# Patient Record
Sex: FEMALE | Race: WHITE | NOT HISPANIC OR LATINO | Employment: UNEMPLOYED | URBAN - METROPOLITAN AREA
[De-identification: names, ages, dates, MRNs, and addresses within clinical notes are randomized per-mention and may not be internally consistent; named-entity substitution may affect disease eponyms.]

---

## 2018-08-30 ASSESSMENT — MIFFLIN-ST. JEOR: SCORE: 1251.12

## 2018-09-05 ENCOUNTER — ANESTHESIA - HEALTHEAST (OUTPATIENT)
Dept: SURGERY | Facility: CLINIC | Age: 53
End: 2018-09-05

## 2018-09-05 ENCOUNTER — SURGERY - HEALTHEAST (OUTPATIENT)
Dept: SURGERY | Facility: CLINIC | Age: 53
End: 2018-09-05

## 2018-09-05 ASSESSMENT — MIFFLIN-ST. JEOR: SCORE: 1247.43

## 2021-06-01 VITALS — WEIGHT: 143.19 LBS | HEIGHT: 65 IN | BODY MASS INDEX: 23.86 KG/M2

## 2021-06-20 ENCOUNTER — HEALTH MAINTENANCE LETTER (OUTPATIENT)
Age: 56
End: 2021-06-20

## 2021-06-20 NOTE — ANESTHESIA CARE TRANSFER NOTE
Last vitals:   Vitals:    09/05/18 1514   BP: 114/65   Pulse: 75   Resp: 16   Temp: 36.8  C (98.3  F)   SpO2: 100%     Patient's level of consciousness is drowsy  Spontaneous respirations: yes  Maintains airway independently: yes  Dentition unchanged: yes  Oropharynx: oropharynx clear of all foreign objects    QCDR Measures:  ASA# 20 - Surgical Safety Checklist: WHO surgical safety checklist completed prior to induction  PQRS# 430 - Adult PONV Prevention: 4558F - Pt received => 2 anti-emetic agents (different classes) preop & intraop  ASA# 8 - Peds PONV Prevention: NA - Not pediatric patient, not GA or 2 or more risk factors NOT present  PQRS# 424 - [place]-op Temp Management: 4559F - At least one body temp DOCUMENTED => 35.5C or 95.9F within required timeframe  PQRS# 426 - PACU Transfer Protocol: - Transfer of care checklist used  ASA# 14 - Acute Post-op Pain: ASA14B - Patient did NOT experience pain >= 7 out of 10

## 2021-06-20 NOTE — ANESTHESIA PROCEDURE NOTES
Peripheral Block    Patient location during procedure: pre-op  Start time: 9/5/2018 11:51 AM  End time: 9/5/2018 11:56 AM  post-op analgesia per surgeon order as noted in medical record  Staffing:  Performing  Anesthesiologist: JACLYN JAIN  Preanesthetic Checklist  Completed: patient identified, site marked, risks, benefits, and alternatives discussed, timeout performed, consent obtained, at patient's request, airway assessed, oxygen available, suction available, emergency drugs available and hand hygiene performed  Peripheral Block  Block type: saphenous  Prep: ChloraPrep  Patient position: supine  Patient monitoring: cardiac monitor, continuous pulse oximetry, blood pressure and heart rate  Laterality: left  Injection technique: ultrasound guided    Ultrasound used to visualize needle placement in proximity to nerve being blocked: yes   Permanent ultrasound image captured for medical record  Sterile gel and probe cover used for ultrasound.    Needle  Needle type: Stimuplex   Needle gauge: 20G  Needle length: 6 in  no peripheral nerve catheter placed  Assessment  Injection assessment: negative aspiration for heme, no difficulty with injection, no paresthesia on injection and incremental injection

## 2021-06-20 NOTE — ANESTHESIA PREPROCEDURE EVALUATION
Anesthesia Evaluation      Patient summary reviewed   History of anesthetic complications     Airway   Mallampati: I  Neck ROM: full   Pulmonary - normal exam   (+) a smoker    ROS comment: Allergic rhinitis.                         Cardiovascular - negative ROS and normal exam  Exercise tolerance: > or = 4 METS  ECG reviewed     ROS comment: DVT/PE.     Neuro/Psych - negative ROS     Endo/Other - negative ROS   (-) no obesity     GI/Hepatic/Renal    (+) GERD,        Other findings: PONV.      Dental - normal exam                        Anesthesia Plan  Planned anesthetic: general LMA and peripheral nerve block  Sciatic and saphenous nerve blocks for POP per surgeon request.  Decadron, Zofran.  Diprivan infusion.  Ketamine (0.5 mg/kg).  Emend.  Toradol.  Tylenol.  ASA 2   Induction: intravenous   Anesthetic plan and risks discussed with: patient  Anesthesia plan special considerations: antiemetics,   Post-op plan: routine recovery

## 2021-06-20 NOTE — ANESTHESIA PROCEDURE NOTES
Peripheral Block    Patient location during procedure: pre-op  Start time: 9/5/2018 11:43 AM  End time: 9/5/2018 11:51 AM  post-op analgesia per surgeon order as noted in medical record  Staffing:  Performing  Anesthesiologist: JACLYN JAIN  Preanesthetic Checklist  Completed: patient identified, site marked, risks, benefits, and alternatives discussed, timeout performed, consent obtained, at patient's request, airway assessed, oxygen available, suction available, emergency drugs available and hand hygiene performed  Peripheral Block  Block type: sciatic  Prep: ChloraPrep  Patient position: supine  Patient monitoring: cardiac monitor, continuous pulse oximetry, blood pressure and heart rate  Laterality: left  Injection technique: ultrasound guided    Ultrasound used to visualize needle placement in proximity to nerve being blocked: yes   Permanent ultrasound image captured for medical record  Sterile gel and probe cover used for ultrasound.    Needle  Needle type: Stimuplex   Needle gauge: 20G  Needle length: 6 in  no peripheral nerve catheter placed  Assessment  Injection assessment: negative aspiration for heme, no difficulty with injection, no paresthesia on injection and incremental injection  Additional Notes  Block done at the bifurcation of the sciatic nerve.

## 2021-06-20 NOTE — ANESTHESIA POSTPROCEDURE EVALUATION
Patient: Kati Schulte  1. LEFT ANKLE ARTHROSCOPIC EVALUATION AND DEBRIDEMENT, LATERAL ANKLE LIGAMENT REPAIR WITH AUGMENTATION, PERONEAL TENDON REPAIR , LEFT 1ST METATARSAL [place]WICK OSTEOTOMY, UMBILICAL HERNIA REPAIR, HEEL SPUR EXCISION  Anesthesia type: general    Patient location: PACU  Last vitals:   Vitals:    09/05/18 1700   BP: (!) 151/92   Pulse: 81   Resp: 16   Temp:    SpO2: 98%     Post vital signs: stable  Level of consciousness: awake and responds to simple questions  Post-anesthesia pain: pain controlled  Post-anesthesia nausea and vomiting: no  Pulmonary: unassisted, return to baseline  Cardiovascular: stable and blood pressure at baseline  Hydration: adequate  Anesthetic events: no    QCDR Measures:  ASA# 11 - [place]-op Cardiac Arrest: ASA11B - Patient did NOT experience unanticipated cardiac arrest  ASA# 12 - [place]-op Mortality Rate: ASA12B - Patient did NOT die  ASA# 13 - PACU Re-Intubation Rate: ASA13B - Patient did NOT require a new airway mgmt  ASA# 10 - Composite Anes Safety: ASA10A - No serious adverse event    Additional Notes:

## 2021-10-11 ENCOUNTER — HEALTH MAINTENANCE LETTER (OUTPATIENT)
Age: 56
End: 2021-10-11

## 2022-07-17 ENCOUNTER — HEALTH MAINTENANCE LETTER (OUTPATIENT)
Age: 57
End: 2022-07-17

## 2022-09-25 ENCOUNTER — HEALTH MAINTENANCE LETTER (OUTPATIENT)
Age: 57
End: 2022-09-25

## 2023-08-03 ENCOUNTER — OFFICE VISIT (OUTPATIENT)
Dept: CARDIOLOGY | Facility: CLINIC | Age: 58
End: 2023-08-03
Payer: COMMERCIAL

## 2023-08-03 VITALS
OXYGEN SATURATION: 99 % | BODY MASS INDEX: 26.5 KG/M2 | DIASTOLIC BLOOD PRESSURE: 85 MMHG | SYSTOLIC BLOOD PRESSURE: 139 MMHG | HEART RATE: 62 BPM | RESPIRATION RATE: 14 BRPM | HEIGHT: 64 IN | WEIGHT: 155.2 LBS

## 2023-08-03 DIAGNOSIS — R07.2 PRECORDIAL PAIN: ICD-10-CM

## 2023-08-03 DIAGNOSIS — R00.2 PALPITATIONS: ICD-10-CM

## 2023-08-03 DIAGNOSIS — I31.39 PERICARDIAL EFFUSION: Primary | ICD-10-CM

## 2023-08-03 LAB
CRP SERPL HS-MCNC: 0.71 MG/L
ERYTHROCYTE [SEDIMENTATION RATE] IN BLOOD BY WESTERGREN METHOD: 8 MM/HR (ref 0–30)

## 2023-08-03 PROCEDURE — 36415 COLL VENOUS BLD VENIPUNCTURE: CPT | Performed by: INTERNAL MEDICINE

## 2023-08-03 PROCEDURE — 86141 C-REACTIVE PROTEIN HS: CPT | Performed by: INTERNAL MEDICINE

## 2023-08-03 PROCEDURE — 99204 OFFICE O/P NEW MOD 45 MIN: CPT | Performed by: INTERNAL MEDICINE

## 2023-08-03 PROCEDURE — 85652 RBC SED RATE AUTOMATED: CPT | Performed by: INTERNAL MEDICINE

## 2023-08-03 RX ORDER — ESTRADIOL 0.1 MG/G
1 CREAM VAGINAL
COMMUNITY
Start: 2023-05-12

## 2023-08-03 RX ORDER — VALACYCLOVIR HYDROCHLORIDE 500 MG/1
1 TABLET, FILM COATED ORAL 2 TIMES DAILY
COMMUNITY
Start: 2023-04-12

## 2023-08-03 RX ORDER — CHLORAL HYDRATE 500 MG
2000 CAPSULE ORAL
COMMUNITY

## 2023-08-03 RX ORDER — SULFACETAMIDE SODIUM, SULFUR 100; 50 MG/G; MG/G
LOTION TOPICAL
COMMUNITY
Start: 2023-06-12 | End: 2024-06-11

## 2023-08-03 RX ORDER — LORAZEPAM 0.5 MG/1
TABLET ORAL
COMMUNITY
Start: 2022-09-26

## 2023-08-03 RX ORDER — CELECOXIB 200 MG/1
1 CAPSULE ORAL DAILY
COMMUNITY
Start: 2022-11-11

## 2023-08-03 RX ORDER — EPINEPHRINE 0.3 MG/.3ML
INJECTION SUBCUTANEOUS
COMMUNITY
Start: 2023-01-13

## 2023-08-03 RX ORDER — SULFACETAMIDE SODIUM, SULFUR 100; 50 MG/G; MG/G
EMULSION TOPICAL
COMMUNITY
Start: 2023-06-16

## 2023-08-03 RX ORDER — TRAZODONE HYDROCHLORIDE 50 MG/1
50 TABLET, FILM COATED ORAL
COMMUNITY
Start: 2023-08-01 | End: 2024-07-31

## 2023-08-03 RX ORDER — CLINDAMYCIN PHOSPHATE 11.9 MG/ML
SOLUTION TOPICAL DAILY
COMMUNITY
Start: 2023-07-31 | End: 2024-07-30

## 2023-08-03 RX ORDER — ACETAZOLAMIDE 250 MG/1
TABLET ORAL
COMMUNITY
Start: 2023-06-22

## 2023-08-03 RX ORDER — METOPROLOL TARTRATE 50 MG
25 TABLET ORAL
COMMUNITY
Start: 2023-05-16

## 2023-08-03 RX ORDER — METRONIDAZOLE 500 MG/1
TABLET ORAL
COMMUNITY
Start: 2023-05-15

## 2023-08-03 NOTE — LETTER
8/3/2023    Guanako Tse  [address]  [facility] [address]    RE: Kati Schulte       Dear Colleague,     I had the pleasure of seeing Kati Schulte in the [facility].      [facility]  [phone]          Assessment/Recommendations   Patient with palpitations, chest discomfort which has some atypical features but sometimes can be brought on with physical activity, and small pericardial effusion.  She does not have classic pericarditis symptoms but I certainly cannot exclude some inflammatory component.  Minimal risk factors for coronary artery disease but certainly cannot exclude the possibility of a cardiac etiology of her discomfort.    Would like to get a CRP and a sedimentation rate to see if there is any generalized inflammation at this time.  She is agreeable.    Would also like to get a stress echocardiogram to see if her rhythm disturbances will go away with activity or worsen with activity.  We will also evaluate for myocardial ischemia and get a repeat look at her pericardial effusion.    If the above tests are unremarkable, would have a low threshold to recommend a calcium score for risk assessment.  She would be interested in that as well.    If the above tests are unremarkable, I would follow-up with an echocardiogram in 1 year and a clinic visit.    Thank you for allowing us to precipitate in her care.       History of Present Illness/Subjective    Ms. Kati Schulte is a 58 year old female with history of palpitations which seem to come and go at times and sometimes can feel as many as 7 or 8 beats in a row.  She has had a Holter monitor which did not show any worrisome rhythm disturbances.  She also had some chest discomfort which she felt was related to spine issues and when her chiropractor would readjust her the discomfort would get better.  They would be anteriorly near these sternal costal joints and sometimes with the tenderness and even  "some swelling in that area.  The seem to have gotten better.  She also had an episode of chest discomfort which was different on the left side that would come and go.  Would last a couple minutes and go away after about 3 hours she also went to her chiropractor got adjusted and later that evening these discomforts went away.  This was not associate with shortness of breath, diaphoresis and did eventually radiate toward her back but no radiation for most of the time she had it.    She is fairly active and walks briskly for at least 30 minutes on her lunch hour daily.  She feels a little more short of breath than she was in the past but she used to be a vigorous exerciser.  She denies orthopnea, paroxysmal nocturnal dyspnea, peripheral edema, syncope, near syncope but does have palpitations.  No history of rheumatic fever, heart murmur, cerebrovascular accident or TIA.    She is not hypertensive, does not smoke cigarettes, has a very reasonable lipid panel with a high HDL.  No family history of premature coronary artery disease and she is not diabetic.  She grew up in a small apartment community in [place] and [place]  She is a .  She has 1 child and grandchildren.  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        ECG: Personally reviewed.        Physical Examination Review of Systems   /85 (BP Location: Left arm, Patient Position: Sitting, Cuff Size: Adult Regular)   Pulse 62   Resp 14   Ht 1.626 m (5' 4\")   Wt 70.4 kg (155 lb 3.2 oz)   SpO2 99%   BMI 26.64 kg/m    Body mass index is 26.64 kg/m .  Wt Readings from Last 3 Encounters:   08/03/23 70.4 kg (155 lb 3.2 oz)   09/05/18 64.9 kg (143 lb 3 oz)     General Appearance:   Alert, cooperative and in no acute distress.   ENT/Mouth: Pink/moist oral mucosa   EYES:  no scleral icterus, normal conjunctivae   Neck: JVP normal. No " "Hepatojugular reflux. Thyroid not visualized.   Chest/Lungs:   Lungs are clear to auscultation, equal chest wall expansion.   Cardiovascular:   S1, S2 without murmur ,clicks or rubs. Brachial, radial and posterior tibial pulses are intact and symetric. No carotid bruits noted   Abdomen:  Nontender.    Extremities: No cyanosis, clubbing or edema   Skin: no xanthelasma, warm.    Neurologic: normal arm movement bilateral, no tremors     Psychiatric: Appropriate affect.      Enc Vitals  BP: 139/85  Pulse: 62  Resp: 14  SpO2: 99 %  Weight: 70.4 kg (155 lb 3.2 oz)  Height: 162.6 cm (5' 4\")                                           Medical History  Surgical History Family History Social History   No past medical history on file. Past Surgical History:   Procedure Laterality Date    ARTHROSCOPY ANKLE Left 9/5/2018    Procedure: LEFT ANKLE ARTHROSCOPIC EVALUATION AND DEBRIDEMENT;  Surgeon: Karel Antonio DPM;  Location: [facility] Main OR;  Service:     ARTHROSCOPY KNEE      CERVICAL FUSION      HERNIA REPAIR, UMBILICAL N/A 9/5/2018    Procedure: UMBILICAL HERNIA REPAIR;  Surgeon: Berny Villasenor MD;  Location: [facility] Main OR;  Service:     RECONSTRUCT ANKLE Left 9/5/2018    Procedure: LATERAL ANKLE LIGAMENT REPAIR WITH AUGMENTATION;  Surgeon: Karel Antonio DPM;  Location: [facility] Main OR;  Service:     REPAIR TENDON PERONEAL Left 9/5/2018    Procedure: PERONEAL TENDON REPAIR ;  Surgeon: Karel Antonio DPM;  Location: [facility] Main OR;  Service:     WRIST SURGERY      No family history on file. Social History     Socioeconomic History    Marital status:      Spouse name: Not on file    Number of children: Not on file    Years of education: Not on file    Highest education level: Not on file   Occupational History    Not on file   Tobacco Use    Smoking status: Former    Smokeless tobacco: Never   Vaping Use    Vaping Use: Never used   Substance and Sexual Activity    Alcohol use: Yes    Drug " use: No    Sexual activity: Not on file   Other Topics Concern    Not on file   Social History Narrative    Not on file     Social Determinants of Health     Financial Resource Strain: Not on file   Food Insecurity: Not on file   Transportation Needs: Not on file   Physical Activity: Not on file   Stress: Not on file   Social Connections: Not on file   Intimate Partner Violence: Not on file   Housing Stability: Not on file          Medications  Allergies   Current Outpatient Medications   Medication Sig Dispense Refill    acetaZOLAMIDE (DIAMOX) 250 MG tablet Take 1 Tablet by mouth two times a day. start day before ascent and continue 2-3 days at maximum altitude      carisoprodol (SOMA) 350 MG tablet [CARISOPRODOL (SOMA) 350 MG TABLET] Take 350 mg by mouth 2 (two) times a day as needed for muscle spasms.      celecoxib (CELEBREX) 200 MG capsule Take 1 capsule by mouth daily      cholecalciferol, vitamin D3, (VITAMIN D3 ORAL) [CHOLECALCIFEROL, VITAMIN D3, (VITAMIN D3 ORAL)] Take 2,000 Units by mouth daily.       clindamycin (CLEOCIN T) 1 % external solution Apply topically daily      diazepam (VALIUM ORAL) [DIAZEPAM (VALIUM ORAL)] Take 10 mg by mouth at bedtime as needed.       diclofenac (VOLTAREN) 1 % topical gel       EPINEPHrine (ANY BX GENERIC EQUIV) 0.3 MG/0.3ML injection 2-pack INJECT 0.3 ML INTRAMUSCULARLY AS NEEDED. MAY REPEAT      esomeprazole (NEXIUM) 40 MG capsule [ESOMEPRAZOLE (NEXIUM) 40 MG CAPSULE] Take 40 mg by mouth daily before breakfast.      estradiol (ESTRACE) 0.1 MG/GM vaginal cream Place 1 g vaginally      fish oil-omega-3 fatty acids 1000 MG capsule Take 2,000 mg by mouth      fluticasone (FLONASE) 50 mcg/actuation nasal spray [FLUTICASONE (FLONASE) 50 MCG/ACTUATION NASAL SPRAY] Apply 1 spray into each nostril 2 (two) times a day.      hydrOXYzine pamoate (VISTARIL) 25 MG capsule [HYDROXYZINE PAMOATE (VISTARIL) 25 MG CAPSULE] Take 1 capsule (25 mg total) by mouth 4 (four) times a day as needed  for itching (post op pain associated). 28 capsule 0    levocetirizine (XYZAL) 5 MG tablet [LEVOCETIRIZINE (XYZAL) 5 MG TABLET] Take 5 mg by mouth daily.      LEVOCETIRIZINE DIHYDROCHLORIDE PO       levonorgestrel (MIRENA) 52 MG (20 mcg/day) IUD 1 each by Intrauterine route      LORazepam (ATIVAN) 0.5 MG tablet TAKE ONE TO TWO TABLETS BY MOUTH EVERY 6 HOURS AS NEEDED FOR ANXIETY      metoprolol tartrate (LOPRESSOR) 50 MG tablet Take 25 mg by mouth      metroNIDAZOLE (FLAGYL) 500 MG tablet       MULTIVITAMIN ORAL [MULTIVITAMIN ORAL] Take 1 tablet by mouth daily.      oxyCODONE-acetaminophen (PERCOCET) 5-325 mg per tablet [OXYCODONE-ACETAMINOPHEN (PERCOCET) 5-325 MG PER TABLET] Take 1 tablet by mouth every 4 (four) hours as needed for pain. 28 tablet 0    Sulfacetamide Sodium-Sulfur 10-5 % LIQD       sulfacetamide sodium-sulfur 10-5 % LOTN CLEANSE FACE ONCE DAILY FOR ACNE      traZODone (DESYREL) 50 MG tablet Take 50 mg by mouth      tretinoin (RETIN-A) 0.025 % cream [TRETINOIN (RETIN-A) 0.025 % CREAM] Apply topically daily as needed.      valACYclovir (VALTREX) 500 MG tablet Take 1 tablet by mouth 2 times daily      Allergies   Allergen Reactions    Bee Venom Protein (Honey Bee) [Bee Venom] Unknown    Sulfa (Sulfonamide Antibiotics) [Sulfa Antibiotics] Rash         Lab Results    Chemistry/lipid CBC Cardiac Enzymes/BNP/TSH/INR   No results found for: CHOL, HDL, TRIG, CHOLHDL, CREATININE, BUN, NA, CO2 No results found for: WBC, HGB, HCT, MCV, PLT No results found for: CKTOTAL, CKMB, TROPONINI, BNP, TSH, INR             Thank you for allowing me to participate in the care of your patient.      Sincerely,     Vitaly Grubbs MD     [facility]  cc:   No referring provider defined for this encounter.

## 2023-08-03 NOTE — PROGRESS NOTES
[facility]  [phone]          Assessment/Recommendations   Patient with palpitations, chest discomfort which has some atypical features but sometimes can be brought on with physical activity, and small pericardial effusion.  She does not have classic pericarditis symptoms but I certainly cannot exclude some inflammatory component.  Minimal risk factors for coronary artery disease but certainly cannot exclude the possibility of a cardiac etiology of her discomfort.    Would like to get a CRP and a sedimentation rate to see if there is any generalized inflammation at this time.  She is agreeable.    Would also like to get a stress echocardiogram to see if her rhythm disturbances will go away with activity or worsen with activity.  We will also evaluate for myocardial ischemia and get a repeat look at her pericardial effusion.    If the above tests are unremarkable, would have a low threshold to recommend a calcium score for risk assessment.  She would be interested in that as well.    If the above tests are unremarkable, I would follow-up with an echocardiogram in 1 year and a clinic visit.    Thank you for allowing us to precipitate in her care.       History of Present Illness/Subjective    Ms. Kati Schulte is a 58 year old female with history of palpitations which seem to come and go at times and sometimes can feel as many as 7 or 8 beats in a row.  She has had a Holter monitor which did not show any worrisome rhythm disturbances.  She also had some chest discomfort which she felt was related to spine issues and when her chiropractor would readjust her the discomfort would get better.  They would be anteriorly near these sternal costal joints and sometimes with the tenderness and even some swelling in that area.  The seem to have gotten better.  She also had an episode of chest discomfort which was different on the left side that would come and go.  Would last a couple minutes and go  "away after about 3 hours she also went to her chiropractor got adjusted and later that evening these discomforts went away.  This was not associate with shortness of breath, diaphoresis and did eventually radiate toward her back but no radiation for most of the time she had it.    She is fairly active and walks briskly for at least 30 minutes on her lunch hour daily.  She feels a little more short of breath than she was in the past but she used to be a vigorous exerciser.  She denies orthopnea, paroxysmal nocturnal dyspnea, peripheral edema, syncope, near syncope but does have palpitations.  No history of rheumatic fever, heart murmur, cerebrovascular accident or TIA.    She is not hypertensive, does not smoke cigarettes, has a very reasonable lipid panel with a high HDL.  No family history of premature coronary artery disease and she is not diabetic.  She grew up in a small apartment community in [place] and [place]  She is a .  She has 1 child and grandchildren.  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        ECG: Personally reviewed.        Physical Examination Review of Systems   /85 (BP Location: Left arm, Patient Position: Sitting, Cuff Size: Adult Regular)   Pulse 62   Resp 14   Ht 1.626 m (5' 4\")   Wt 70.4 kg (155 lb 3.2 oz)   SpO2 99%   BMI 26.64 kg/m    Body mass index is 26.64 kg/m .  Wt Readings from Last 3 Encounters:   08/03/23 70.4 kg (155 lb 3.2 oz)   09/05/18 64.9 kg (143 lb 3 oz)     General Appearance:   Alert, cooperative and in no acute distress.   ENT/Mouth: Pink/moist oral mucosa   EYES:  no scleral icterus, normal conjunctivae   Neck: JVP normal. No Hepatojugular reflux. Thyroid not visualized.   Chest/Lungs:   Lungs are clear to auscultation, equal chest wall expansion.   Cardiovascular:   S1, S2 without murmur ,clicks or rubs. Brachial, radial and " "posterior tibial pulses are intact and symetric. No carotid bruits noted   Abdomen:  Nontender.    Extremities: No cyanosis, clubbing or edema   Skin: no xanthelasma, warm.    Neurologic: normal arm movement bilateral, no tremors     Psychiatric: Appropriate affect.      Enc Vitals  BP: 139/85  Pulse: 62  Resp: 14  SpO2: 99 %  Weight: 70.4 kg (155 lb 3.2 oz)  Height: 162.6 cm (5' 4\")                                           Medical History  Surgical History Family History Social History   No past medical history on file. Past Surgical History:   Procedure Laterality Date    ARTHROSCOPY ANKLE Left 9/5/2018    Procedure: LEFT ANKLE ARTHROSCOPIC EVALUATION AND DEBRIDEMENT;  Surgeon: Karel Antonio DPM;  Location: [facility] Main OR;  Service:     ARTHROSCOPY KNEE      CERVICAL FUSION      HERNIA REPAIR, UMBILICAL N/A 9/5/2018    Procedure: UMBILICAL HERNIA REPAIR;  Surgeon: Berny Villasenor MD;  Location: [facility] Main OR;  Service:     RECONSTRUCT ANKLE Left 9/5/2018    Procedure: LATERAL ANKLE LIGAMENT REPAIR WITH AUGMENTATION;  Surgeon: Karel Antonio DPM;  Location: [facility] Main OR;  Service:     REPAIR TENDON PERONEAL Left 9/5/2018    Procedure: PERONEAL TENDON REPAIR ;  Surgeon: Karel Antonio DPM;  Location: [facility] Main OR;  Service:     WRIST SURGERY      No family history on file. Social History     Socioeconomic History    Marital status:      Spouse name: Not on file    Number of children: Not on file    Years of education: Not on file    Highest education level: Not on file   Occupational History    Not on file   Tobacco Use    Smoking status: Former    Smokeless tobacco: Never   Vaping Use    Vaping Use: Never used   Substance and Sexual Activity    Alcohol use: Yes    Drug use: No    Sexual activity: Not on file   Other Topics Concern    Not on file   Social History Narrative    Not on file     Social Determinants of Health     Financial Resource Strain: Not on file   Food " Insecurity: Not on file   Transportation Needs: Not on file   Physical Activity: Not on file   Stress: Not on file   Social Connections: Not on file   Intimate Partner Violence: Not on file   Housing Stability: Not on file          Medications  Allergies   Current Outpatient Medications   Medication Sig Dispense Refill    acetaZOLAMIDE (DIAMOX) 250 MG tablet Take 1 Tablet by mouth two times a day. start day before ascent and continue 2-3 days at maximum altitude      carisoprodol (SOMA) 350 MG tablet [CARISOPRODOL (SOMA) 350 MG TABLET] Take 350 mg by mouth 2 (two) times a day as needed for muscle spasms.      celecoxib (CELEBREX) 200 MG capsule Take 1 capsule by mouth daily      cholecalciferol, vitamin D3, (VITAMIN D3 ORAL) [CHOLECALCIFEROL, VITAMIN D3, (VITAMIN D3 ORAL)] Take 2,000 Units by mouth daily.       clindamycin (CLEOCIN T) 1 % external solution Apply topically daily      diazepam (VALIUM ORAL) [DIAZEPAM (VALIUM ORAL)] Take 10 mg by mouth at bedtime as needed.       diclofenac (VOLTAREN) 1 % topical gel       EPINEPHrine (ANY BX GENERIC EQUIV) 0.3 MG/0.3ML injection 2-pack INJECT 0.3 ML INTRAMUSCULARLY AS NEEDED. MAY REPEAT      esomeprazole (NEXIUM) 40 MG capsule [ESOMEPRAZOLE (NEXIUM) 40 MG CAPSULE] Take 40 mg by mouth daily before breakfast.      estradiol (ESTRACE) 0.1 MG/GM vaginal cream Place 1 g vaginally      fish oil-omega-3 fatty acids 1000 MG capsule Take 2,000 mg by mouth      fluticasone (FLONASE) 50 mcg/actuation nasal spray [FLUTICASONE (FLONASE) 50 MCG/ACTUATION NASAL SPRAY] Apply 1 spray into each nostril 2 (two) times a day.      hydrOXYzine pamoate (VISTARIL) 25 MG capsule [HYDROXYZINE PAMOATE (VISTARIL) 25 MG CAPSULE] Take 1 capsule (25 mg total) by mouth 4 (four) times a day as needed for itching (post op pain associated). 28 capsule 0    levocetirizine (XYZAL) 5 MG tablet [LEVOCETIRIZINE (XYZAL) 5 MG TABLET] Take 5 mg by mouth daily.      LEVOCETIRIZINE DIHYDROCHLORIDE PO        levonorgestrel (MIRENA) 52 MG (20 mcg/day) IUD 1 each by Intrauterine route      LORazepam (ATIVAN) 0.5 MG tablet TAKE ONE TO TWO TABLETS BY MOUTH EVERY 6 HOURS AS NEEDED FOR ANXIETY      metoprolol tartrate (LOPRESSOR) 50 MG tablet Take 25 mg by mouth      metroNIDAZOLE (FLAGYL) 500 MG tablet       MULTIVITAMIN ORAL [MULTIVITAMIN ORAL] Take 1 tablet by mouth daily.      oxyCODONE-acetaminophen (PERCOCET) 5-325 mg per tablet [OXYCODONE-ACETAMINOPHEN (PERCOCET) 5-325 MG PER TABLET] Take 1 tablet by mouth every 4 (four) hours as needed for pain. 28 tablet 0    Sulfacetamide Sodium-Sulfur 10-5 % LIQD       sulfacetamide sodium-sulfur 10-5 % LOTN CLEANSE FACE ONCE DAILY FOR ACNE      traZODone (DESYREL) 50 MG tablet Take 50 mg by mouth      tretinoin (RETIN-A) 0.025 % cream [TRETINOIN (RETIN-A) 0.025 % CREAM] Apply topically daily as needed.      valACYclovir (VALTREX) 500 MG tablet Take 1 tablet by mouth 2 times daily      Allergies   Allergen Reactions    Bee Venom Protein (Honey Bee) [Bee Venom] Unknown    Sulfa (Sulfonamide Antibiotics) [Sulfa Antibiotics] Rash         Lab Results    Chemistry/lipid CBC Cardiac Enzymes/BNP/TSH/INR   No results found for: CHOL, HDL, TRIG, CHOLHDL, CREATININE, BUN, NA, CO2 No results found for: WBC, HGB, HCT, MCV, PLT No results found for: CKTOTAL, CKMB, TROPONINI, BNP, TSH, INR

## 2023-08-05 ENCOUNTER — HEALTH MAINTENANCE LETTER (OUTPATIENT)
Age: 58
End: 2023-08-05

## 2023-08-16 ENCOUNTER — HOSPITAL ENCOUNTER (OUTPATIENT)
Dept: CARDIOLOGY | Facility: CLINIC | Age: 58
Discharge: HOME | End: 2023-08-16
Attending: INTERNAL MEDICINE | Admitting: INTERNAL MEDICINE
Payer: COMMERCIAL

## 2023-08-16 DIAGNOSIS — R00.2 PALPITATIONS: ICD-10-CM

## 2023-08-16 DIAGNOSIS — R07.2 PRECORDIAL PAIN: ICD-10-CM

## 2023-08-16 DIAGNOSIS — I31.39 PERICARDIAL EFFUSION: ICD-10-CM

## 2023-08-16 PROCEDURE — 93016 CV STRESS TEST SUPVJ ONLY: CPT | Performed by: GENERAL ACUTE CARE HOSPITAL

## 2023-08-16 PROCEDURE — 93017 CV STRESS TEST TRACING ONLY: CPT

## 2023-08-16 PROCEDURE — 255N000002 HC RX 255 OP 636: Performed by: INTERNAL MEDICINE

## 2023-08-16 PROCEDURE — 93352 ADMIN ECG CONTRAST AGENT: CPT | Performed by: GENERAL ACUTE CARE HOSPITAL

## 2023-08-16 PROCEDURE — 93325 DOPPLER ECHO COLOR FLOW MAPG: CPT | Mod: TC

## 2023-08-16 PROCEDURE — 93325 DOPPLER ECHO COLOR FLOW MAPG: CPT | Mod: 26 | Performed by: GENERAL ACUTE CARE HOSPITAL

## 2023-08-16 PROCEDURE — 93350 STRESS TTE ONLY: CPT | Mod: 26 | Performed by: GENERAL ACUTE CARE HOSPITAL

## 2023-08-16 PROCEDURE — 93321 DOPPLER ECHO F-UP/LMTD STD: CPT | Mod: 26 | Performed by: GENERAL ACUTE CARE HOSPITAL

## 2023-08-16 PROCEDURE — 93018 CV STRESS TEST I&R ONLY: CPT | Performed by: GENERAL ACUTE CARE HOSPITAL

## 2023-08-16 RX ADMIN — PERFLUTREN 4 ML: 6.52 INJECTION, SUSPENSION INTRAVENOUS at 14:35

## 2023-08-22 DIAGNOSIS — R07.2 PRECORDIAL PAIN: Primary | ICD-10-CM

## 2023-08-22 DIAGNOSIS — R00.2 PALPITATIONS: ICD-10-CM

## 2023-11-08 ENCOUNTER — HOSPITAL ENCOUNTER (OUTPATIENT)
Dept: CT IMAGING | Facility: CLINIC | Age: 58
Discharge: HOME | End: 2023-11-08
Attending: INTERNAL MEDICINE | Admitting: INTERNAL MEDICINE
Payer: COMMERCIAL

## 2023-11-08 DIAGNOSIS — R07.2 PRECORDIAL PAIN: ICD-10-CM

## 2023-11-08 DIAGNOSIS — R00.2 PALPITATIONS: ICD-10-CM

## 2023-11-08 LAB
CV CALCIUM SCORE AGATSTON LM: 0
CV CALCIUM SCORING AGATSON LAD: 0
CV CALCIUM SCORING AGATSTON CX: 0
CV CALCIUM SCORING AGATSTON RCA: 0
CV CALCIUM SCORING AGATSTON TOTAL: 0

## 2023-11-08 PROCEDURE — 75571 CT HRT W/O DYE W/CA TEST: CPT

## 2023-11-08 PROCEDURE — 75571 CT HRT W/O DYE W/CA TEST: CPT | Mod: 26 | Performed by: GENERAL ACUTE CARE HOSPITAL

## 2023-11-09 DIAGNOSIS — R00.2 PALPITATIONS: ICD-10-CM

## 2023-11-09 DIAGNOSIS — R07.2 PRECORDIAL PAIN: Primary | ICD-10-CM

## 2024-01-27 DIAGNOSIS — Z79.1 NSAID LONG-TERM USE: ICD-10-CM

## 2024-01-27 DIAGNOSIS — Z13.1 SCREENING FOR DIABETES MELLITUS: Primary | ICD-10-CM

## 2024-01-30 ENCOUNTER — LAB (OUTPATIENT)
Dept: LAB | Facility: CLINIC | Age: 59
End: 2024-01-30

## 2024-01-30 DIAGNOSIS — Z79.1 NSAID LONG-TERM USE: ICD-10-CM

## 2024-01-30 DIAGNOSIS — Z13.1 SCREENING FOR DIABETES MELLITUS: ICD-10-CM

## 2024-01-30 LAB
ALT SERPL W P-5'-P-CCNC: 13 U/L (ref 0–50)
ANION GAP SERPL CALCULATED.3IONS-SCNC: 7 MMOL/L (ref 7–15)
BUN SERPL-MCNC: 12.3 MG/DL (ref 6–20)
CALCIUM SERPL-MCNC: 9.7 MG/DL (ref 8.6–10)
CHLORIDE SERPL-SCNC: 95 MMOL/L (ref 98–107)
CREAT SERPL-MCNC: 0.93 MG/DL (ref 0.51–0.95)
DEPRECATED HCO3 PLAS-SCNC: 32 MMOL/L (ref 22–29)
EGFRCR SERPLBLD CKD-EPI 2021: 72 ML/MIN/1.73M2
GLUCOSE SERPL-MCNC: 73 MG/DL (ref 70–99)
HBA1C MFR BLD: 5.7 %
HGB BLD-MCNC: 13.8 G/DL (ref 11.7–15.7)
POTASSIUM SERPL-SCNC: 4.3 MMOL/L (ref 3.4–5.3)
SODIUM SERPL-SCNC: 134 MMOL/L (ref 135–145)

## 2024-01-30 PROCEDURE — 80048 BASIC METABOLIC PNL TOTAL CA: CPT

## 2024-01-30 PROCEDURE — 85018 HEMOGLOBIN: CPT

## 2024-01-30 PROCEDURE — 83036 HEMOGLOBIN GLYCOSYLATED A1C: CPT

## 2024-01-30 PROCEDURE — 36415 COLL VENOUS BLD VENIPUNCTURE: CPT

## 2024-01-30 PROCEDURE — 84460 ALANINE AMINO (ALT) (SGPT): CPT

## 2024-09-24 ENCOUNTER — TRANSFERRED RECORDS (OUTPATIENT)
Dept: MULTI SPECIALTY CLINIC | Facility: CLINIC | Age: 59
End: 2024-09-24

## 2024-09-28 ENCOUNTER — HEALTH MAINTENANCE LETTER (OUTPATIENT)
Age: 59
End: 2024-09-28

## 2024-11-25 ENCOUNTER — OFFICE VISIT (OUTPATIENT)
Dept: INTERNAL MEDICINE | Facility: CLINIC | Age: 59
End: 2024-11-25
Payer: COMMERCIAL

## 2024-11-25 VITALS
HEIGHT: 64 IN | DIASTOLIC BLOOD PRESSURE: 86 MMHG | HEART RATE: 68 BPM | RESPIRATION RATE: 16 BRPM | BODY MASS INDEX: 25.1 KG/M2 | OXYGEN SATURATION: 99 % | WEIGHT: 147 LBS | SYSTOLIC BLOOD PRESSURE: 120 MMHG | TEMPERATURE: 97.6 F

## 2024-11-25 DIAGNOSIS — Z78.0 MENOPAUSE: ICD-10-CM

## 2024-11-25 DIAGNOSIS — M54.2 CHRONIC NECK PAIN: ICD-10-CM

## 2024-11-25 DIAGNOSIS — K21.9 GASTROESOPHAGEAL REFLUX DISEASE WITHOUT ESOPHAGITIS: ICD-10-CM

## 2024-11-25 DIAGNOSIS — G89.29 CHRONIC NECK PAIN: ICD-10-CM

## 2024-11-25 DIAGNOSIS — Z01.818 PREOP GENERAL PHYSICAL EXAM: Primary | ICD-10-CM

## 2024-11-25 DIAGNOSIS — R00.2 PALPITATIONS: ICD-10-CM

## 2024-11-25 DIAGNOSIS — F41.9 ANXIETY: ICD-10-CM

## 2024-11-25 LAB
ALBUMIN UR-MCNC: NEGATIVE MG/DL
APPEARANCE UR: CLEAR
ATRIAL RATE - MUSE: 63 BPM
BASOPHILS # BLD AUTO: 0 10E3/UL (ref 0–0.2)
BASOPHILS NFR BLD AUTO: 0 %
BILIRUB UR QL STRIP: NEGATIVE
COLOR UR AUTO: YELLOW
DIASTOLIC BLOOD PRESSURE - MUSE: NORMAL MMHG
EOSINOPHIL # BLD AUTO: 0.1 10E3/UL (ref 0–0.7)
EOSINOPHIL NFR BLD AUTO: 2 %
ERYTHROCYTE [DISTWIDTH] IN BLOOD BY AUTOMATED COUNT: 11.8 % (ref 10–15)
GLUCOSE UR STRIP-MCNC: NEGATIVE MG/DL
HCT VFR BLD AUTO: 38.5 % (ref 35–47)
HGB BLD-MCNC: 13.2 G/DL (ref 11.7–15.7)
HGB UR QL STRIP: NEGATIVE
IMM GRANULOCYTES # BLD: 0 10E3/UL
IMM GRANULOCYTES NFR BLD: 0 %
INTERPRETATION ECG - MUSE: NORMAL
KETONES UR STRIP-MCNC: NEGATIVE MG/DL
LEUKOCYTE ESTERASE UR QL STRIP: NEGATIVE
LYMPHOCYTES # BLD AUTO: 1.2 10E3/UL (ref 0.8–5.3)
LYMPHOCYTES NFR BLD AUTO: 26 %
MCH RBC QN AUTO: 31.3 PG (ref 26.5–33)
MCHC RBC AUTO-ENTMCNC: 34.3 G/DL (ref 31.5–36.5)
MCV RBC AUTO: 91 FL (ref 78–100)
MONOCYTES # BLD AUTO: 0.4 10E3/UL (ref 0–1.3)
MONOCYTES NFR BLD AUTO: 8 %
NEUTROPHILS # BLD AUTO: 3 10E3/UL (ref 1.6–8.3)
NEUTROPHILS NFR BLD AUTO: 63 %
NITRATE UR QL: NEGATIVE
P AXIS - MUSE: 29 DEGREES
PH UR STRIP: 6.5 [PH] (ref 5–8)
PLATELET # BLD AUTO: 226 10E3/UL (ref 150–450)
PR INTERVAL - MUSE: 112 MS
QRS DURATION - MUSE: 70 MS
QT - MUSE: 390 MS
QTC - MUSE: 399 MS
R AXIS - MUSE: 9 DEGREES
RBC # BLD AUTO: 4.22 10E6/UL (ref 3.8–5.2)
SP GR UR STRIP: 1.01 (ref 1–1.03)
SYSTOLIC BLOOD PRESSURE - MUSE: NORMAL MMHG
T AXIS - MUSE: 49 DEGREES
UROBILINOGEN UR STRIP-ACNC: 0.2 E.U./DL
VENTRICULAR RATE- MUSE: 63 BPM
WBC # BLD AUTO: 4.7 10E3/UL (ref 4–11)

## 2024-11-25 PROCEDURE — 81003 URINALYSIS AUTO W/O SCOPE: CPT | Performed by: NURSE PRACTITIONER

## 2024-11-25 PROCEDURE — 85025 COMPLETE CBC W/AUTO DIFF WBC: CPT | Performed by: NURSE PRACTITIONER

## 2024-11-25 PROCEDURE — 99204 OFFICE O/P NEW MOD 45 MIN: CPT | Performed by: NURSE PRACTITIONER

## 2024-11-25 PROCEDURE — 36415 COLL VENOUS BLD VENIPUNCTURE: CPT | Performed by: NURSE PRACTITIONER

## 2024-11-25 PROCEDURE — 93005 ELECTROCARDIOGRAM TRACING: CPT | Performed by: NURSE PRACTITIONER

## 2024-11-25 PROCEDURE — 93010 ELECTROCARDIOGRAM REPORT: CPT | Performed by: INTERNAL MEDICINE

## 2024-11-25 RX ORDER — HYDROMORPHONE HYDROCHLORIDE 2 MG/1
1 TABLET ORAL EVERY 6 HOURS PRN
COMMUNITY
Start: 2024-04-10

## 2024-11-25 RX ORDER — DIAZEPAM 5 MG/1
TABLET ORAL
COMMUNITY
Start: 2024-10-30

## 2024-11-25 RX ORDER — ZOLPIDEM TARTRATE 6.25 MG/1
6.25 TABLET, FILM COATED, EXTENDED RELEASE ORAL
COMMUNITY
Start: 2023-09-26

## 2024-11-25 RX ORDER — ESTRADIOL 0.03 MG/D
FILM, EXTENDED RELEASE TRANSDERMAL
COMMUNITY
Start: 2024-11-21

## 2024-11-25 NOTE — PATIENT INSTRUCTIONS
How to Take Your Medication Before Surgery  Preoperative Medication Instructions   Antiplatelet or Anticoagulation Medication Instructions   - Patient is on no antiplatelet or anticoagulation medications.    Additional Medication Instructions  Take all scheduled medications on the day of surgery EXCEPT for modifications listed below:   - Diuretics: DO NOT TAKE on the day of surgery.   - Herbal medications and vitamins: DO NOT TAKE 14 days prior to surgery.   - Opioids: Continue without modification.   956}   - celecoxib (Celebrex): DO NOT TAKE 3 days before surgery. May continue without modification for management of severe pain.    - Topicals: DO NOT TAKE day of surgery.   Okay to take her Nexium, Flonase, hydromorphone, lorazepam, diazepam and muscle relaxers if needed the morning of surgery.    If you apply the estrogen patch prior to surgery let them know patch placement upon arrival.    Patient Education   Preparing for Your Surgery  For Adults  Getting started  In most cases, a nurse will call to review your health history and instructions. They will give you an arrival time based on your scheduled surgery time. Please be ready to share:  Your doctor's clinic name and phone number  Your medical, surgical, and anesthesia history  A list of allergies and sensitivities  A list of medicines, including herbal treatments and over-the-counter drugs  Whether the patient has a legal guardian (ask how to send us the papers in advance)  Note: You may not receive a call if you were seen at our PAC (Preoperative Assessment Center).  Please tell us if you're pregnant--or if there's any chance you might be pregnant. Some surgeries may injure a fetus (unborn baby), so they require a pregnancy test. Surgeries that are safe for a fetus don't always need a test, and you can choose whether to have one.   Preparing for surgery  Within 10 to 30 days of surgery: Have a pre-op exam (sometimes called an H&P, or History and Physical).  This can be done at a clinic or pre-operative center.  If you're having a , you may not need this exam. Talk to your care team.  At your pre-op exam, talk to your care team about all medicines you take. (This includes CBD oil and any drugs, such as THC, marijuana, and other forms of cannabis.) If you need to stop any medicine before surgery, ask when to start taking it again.  This is for your safety. Many medicines and drugs can make you bleed too much during surgery. Some change how well surgery (anesthesia) drugs work.  Call your insurance company to let them know you're having surgery. (If you don't have insurance, call [phone].)  Call your clinic if there's any change in your health. This includes a scrape or scratch near the surgery site, or any signs of a cold (sore throat, runny nose, cough, rash, fever).  Eating and drinking guidelines  For your safety: Unless your surgeon tells you otherwise, follow the guidelines below.  Eat and drink as normal until 8 hours before you arrive for surgery. After that, no food or milk. You can spit out gum when you arrive.  Drink clear liquids until 2 hours before you arrive. These are liquids you can see through, like water, Gatorade, and Propel Water. They also include plain black coffee and tea (no cream or milk).  No alcohol for 24 hours before you arrive. The night before surgery, stop any drinks that contain THC.  If your care team tells you to take medicine on the morning of surgery, it's okay to take it with a sip of water. No other medicines or drugs are allowed (including CBD oil)--follow your care team's instructions.  If you have questions the day of surgery, call your hospital or surgery center.   Preventing infection  Shower or bathe the night before and the morning of surgery. Follow the instructions your clinic gave you. (If no instructions, use regular soap.)  Don't shave or clip hair near your surgery site. We'll remove the hair if  needed.  Don't smoke or vape the morning of surgery. No chewing tobacco for 6 hours before you arrive. A nicotine patch is okay. You may spit out nicotine gum when you arrive.  For some surgeries, the surgeon will tell you to fully quit smoking and nicotine.  We will make every effort to keep you safe from infection. We will:  Clean our hands often with soap and water (or an alcohol-based hand rub).  Clean the skin at your surgery site with a special soap that kills germs.  Give you a special gown to keep you warm. (Cold raises the risk of infection.)  Wear hair covers, masks, gowns, and gloves during surgery.  Give antibiotic medicine, if prescribed. Not all surgeries need this medicine.  What to bring on the day of surgery  Photo ID and insurance card  Copy of your health care directive, if you have one  Glasses and hearing aids (bring cases)  You can't wear contacts during surgery  Inhaler and eye drops, if you use them (tell us about these when you arrive)  CPAP machine or breathing device, if you use them  A few personal items, if spending the night  If you have . . .  A pacemaker, ICD (cardiac defibrillator), or other implant: Bring the ID card.  An implanted stimulator: Bring the remote control.  A legal guardian: Bring a copy of the certified (court-stamped) guardianship papers.  Please remove any jewelry, including body piercings. Leave jewelry and other valuables at home.  If you're going home the day of surgery  You must have a responsible adult drive you home. They should stay with you overnight as well.  If you don't have someone to stay with you, and you aren't safe to go home alone, we may keep you overnight. Insurance often won't pay for this.  After surgery  If it's hard to control your pain or you need more pain medicine, please call your surgeon's office.  Questions?   If you have any questions for your care team, list them here:    ____________________________________________________________________________________________________________________________________________________________________________________________________________________________________________________________  For informational purposes only. Not to replace the advice of your health care provider. Copyright   2003, 2019 [facility]. All rights reserved. Clinically reviewed by Laurent Lincoln MD. [employer] [id number] - REV 08/24.

## 2024-11-25 NOTE — PROGRESS NOTES
Preoperative Evaluation  [facility]  [address] [address]  Phone: [phone]  Fax: [phone]  Primary Provider: Guanako Tse  Pre-op Performing Provider: Arabella Miles NP  Nov 25, 2024 11/25/2024   Surgical Information   What procedure is being done? right C4 laminoforaminotomy    Facility or Hospital where procedure/surgery will be performed: [facility]    Who is doing the procedure / surgery? Dr Remi Burnham    Date of surgery / procedure: 12/12/2024    Time of surgery / procedure: ?    Where do you plan to recover after surgery? at home with family        Patient-reported     Fax number for surgical facility: [facility]  [phone]    Assessment & Plan     The proposed surgical procedure is considered INTERMEDIATE risk.    Preop general physical exam  Kati is a pleasant 59-year-old female here today for preoperative evaluation prior to a laminoforaminotomy of her C4 due to chronic neck pain.  - EKG 12-lead, tracing only  - CBC with Platelets & Differential; Future  - UA Macroscopic with reflex to Microscopic and Culture; Future  - CBC with Platelets & Differential  - UA Macroscopic with reflex to Microscopic and Culture    Chronic neck pain  Patient has chronic neck pain and states she had a fusion of her neck in 2028.  She has been ongoing pain ever since.  Occasionally has some cervical radiculopathy on and off sometimes in the right arm, sometimes in the left arm.  She states that this time she has some nerve root damage that they plan to repair with the planned surgery.  Uses Soma, hydromorphone, Celebrex, ibuprofen, diclofenac gel as needed for pain.  She states she occasionally uses CBD oil as well.  Advised to hold any topicals the night before and the morning of surgery.  Hold Celebrex 3 days prior to surgery.  Hold ibuprofen 1 day prior to surgery.  Can continue with the hydromorphone and Soma prior  to surgery without any modifications.    Palpitations  Patient reports a history of palpitations in 2023 that she saw cardiology for and had a workup done at that time.  Workup was unremarkable.  EKG was obtained today and showed sinus rhythm.  No acute symptoms at this time.    Gastroesophageal reflux disease without esophagitis  History of acid reflux currently managed with Nexium.  Continue Nexium the morning of surgery with a small sip of water.    Menopause  Patient reports a history of menopausal like symptoms and has been prescribed an estradiol patch.  She has not yet started this patch.  She states that she plans on starting it soon.  Discussed that she can continue with estrogen if started prior to surgery she should just let anesthesia know the day of surgery the placement of her patch.    Anxiety  Has a significant history of anxiety that she uses lorazepam or diazepam as needed.  Can continue these modifications prior to surgery as needed without any modifications.  She also uses trazodone or Ambien to help with sleep.  She can use these medications prior to surgery if needed.            - No identified additional risk factors other than previously addressed    Preoperative Medication Instructions  Antiplatelet or Anticoagulation Medication Instructions   - Patient is on no antiplatelet or anticoagulation medications.    Additional Medication Instructions  Take all scheduled medications on the day of surgery EXCEPT for modifications listed below:   - Diuretics: DO NOT TAKE on the day of surgery.   - Herbal medications and vitamins: DO NOT TAKE 14 days prior to surgery.   - Opioids: Continue without modification.   956}   - celecoxib (Celebrex): DO NOT TAKE 3 days before surgery. May continue without modification for management of severe pain.    - Topicals: DO NOT TAKE day of surgery.    Recommendation  Approval given to proceed with proposed procedure, without further diagnostic  evaluation.    Aditya Parikh is a 59 year old, presenting for the following:  Pre-Op Exam (Neck surgery on 12/12/24 at [facility] by Dr Burnham)          11/25/2024    10:51 AM   Additional Questions   Roomed by Dayan COLLINS related to upcoming procedure: Chronic neck pain. History of fusion in 2008. Last two years pain has been worsening. Has on and off radiculopathy in both arms.         11/25/2024   Pre-Op Questionnaire   Have you ever had a heart attack or stroke? No    Have you ever had surgery on your heart or blood vessels, such as a stent placement, a coronary artery bypass, or surgery on an artery in your head, neck, heart, or legs? No    Do you have chest pain with activity? No    Do you have a history of heart failure? No    Do you currently have a cold, bronchitis or symptoms of other infection? No    Do you have a cough, shortness of breath, or wheezing? No    Do you or anyone in your family have previous history of blood clots? (!) YES  PE in 2008-after taking a long trip after surgery and starting OCP. Has negative workup after.    Do you or does anyone in your family have a serious bleeding problem such as prolonged bleeding following surgeries or cuts? No    Have you ever had problems with anemia or been told to take iron pills? No    Have you had any abnormal blood loss such as black, tarry or bloody stools, or abnormal vaginal bleeding? No    Have you ever had a blood transfusion? No    Are you willing to have a blood transfusion if it is medically needed before, during, or after your surgery? Yes    Have you or any of your relatives ever had problems with anesthesia? No    Do you have sleep apnea, excessive snoring or daytime drowsiness? No    Do you have any artifical heart valves or other implanted medical devices like a pacemaker, defibrillator, or continuous glucose monitor? No    Do you have artificial joints? No    Are you allergic to latex? No        Patient-reported     Health  Care Directive  Patient does not have a Health Care Directive: Discussed advance care planning with patient; however, patient declined at this time.    Preoperative Review of    reviewed - controlled substances reflected in medication list.          Patient Active Problem List    Diagnosis Date Noted    Pericardial effusion 08/03/2023     Priority: Medium    Precordial pain 08/03/2023     Priority: Medium    Palpitations 08/03/2023     Priority: Medium      No past medical history on file.  Past Surgical History:   Procedure Laterality Date    ARTHROSCOPY ANKLE Left 9/5/2018    Procedure: LEFT ANKLE ARTHROSCOPIC EVALUATION AND DEBRIDEMENT;  Surgeon: Karel Antonio DPM;  Location: [facility] OR;  Service:     ARTHROSCOPY KNEE      CERVICAL FUSION      HERNIA REPAIR, UMBILICAL N/A 9/5/2018    Procedure: UMBILICAL HERNIA REPAIR;  Surgeon: Berny Villasenor MD;  Location: [facility] OR;  Service:     RECONSTRUCT ANKLE Left 9/5/2018    Procedure: LATERAL ANKLE LIGAMENT REPAIR WITH AUGMENTATION;  Surgeon: Karel Antonio DPM;  Location: [facility] OR;  Service:     REPAIR TENDON PERONEAL Left 9/5/2018    Procedure: PERONEAL TENDON REPAIR ;  Surgeon: Karel Antonio DPM;  Location: [facility] OR;  Service:     WRIST SURGERY       Current Outpatient Medications   Medication Sig Dispense Refill    carisoprodol (SOMA) 350 MG tablet [CARISOPRODOL (SOMA) 350 MG TABLET] Take 350 mg by mouth 2 (two) times a day as needed for muscle spasms.      cholecalciferol, vitamin D3, (VITAMIN D3 ORAL) [CHOLECALCIFEROL, VITAMIN D3, (VITAMIN D3 ORAL)] Take 2,000 Units by mouth daily.       diazepam (VALIUM ORAL) [DIAZEPAM (VALIUM ORAL)] Take 10 mg by mouth at bedtime as needed.       diazepam (VALIUM) 5 MG tablet       diclofenac (VOLTAREN) 1 % topical gel       EPINEPHrine (ANY BX GENERIC EQUIV) 0.3 MG/0.3ML injection 2-pack INJECT 0.3 ML INTRAMUSCULARLY AS NEEDED. MAY REPEAT      esomeprazole (NEXIUM) 40  MG capsule [ESOMEPRAZOLE (NEXIUM) 40 MG CAPSULE] Take 40 mg by mouth daily before breakfast.      estradiol (VIVELLE-DOT) 0.025 MG/24HR bi-weekly patch       fish oil-omega-3 fatty acids 1000 MG capsule Take 2,000 mg by mouth      fluticasone (FLONASE) 50 mcg/actuation nasal spray [FLUTICASONE (FLONASE) 50 MCG/ACTUATION NASAL SPRAY] Apply 1 spray into each nostril 2 (two) times a day.      HYDROmorphone (DILAUDID) 2 MG tablet Take 1 tablet by mouth every 6 hours as needed.      hydrOXYzine pamoate (VISTARIL) 25 MG capsule [HYDROXYZINE PAMOATE (VISTARIL) 25 MG CAPSULE] Take 1 capsule (25 mg total) by mouth 4 (four) times a day as needed for itching (post op pain associated). 28 capsule 0    levocetirizine (XYZAL) 5 MG tablet [LEVOCETIRIZINE (XYZAL) 5 MG TABLET] Take 5 mg by mouth daily.      levonorgestrel (MIRENA) 52 MG (20 mcg/day) IUD 1 each by Intrauterine route      LORazepam (ATIVAN) 0.5 MG tablet TAKE ONE TO TWO TABLETS BY MOUTH EVERY 6 HOURS AS NEEDED FOR ANXIETY      MULTIVITAMIN ORAL [MULTIVITAMIN ORAL] Take 1 tablet by mouth daily.      oxyCODONE-acetaminophen (PERCOCET) 5-325 mg per tablet [OXYCODONE-ACETAMINOPHEN (PERCOCET) 5-325 MG PER TABLET] Take 1 tablet by mouth every 4 (four) hours as needed for pain. 28 tablet 0    tretinoin (RETIN-A) 0.025 % cream [TRETINOIN (RETIN-A) 0.025 % CREAM] Apply topically daily as needed.      valACYclovir (VALTREX) 500 MG tablet Take 1 tablet by mouth 2 times daily      zolpidem ER (AMBIEN CR) 6.25 MG CR tablet Take 6.25 mg by mouth.      acetaZOLAMIDE (DIAMOX) 250 MG tablet Take 1 Tablet by mouth two times a day. start day before ascent and continue 2-3 days at maximum altitude (Patient not taking: Reported on 11/25/2024)      celecoxib (CELEBREX) 200 MG capsule Take 1 capsule by mouth daily (Patient not taking: Reported on 11/25/2024)      estradiol (ESTRACE) 0.1 MG/GM vaginal cream Place 1 g vaginally (Patient not taking: Reported on 11/25/2024)      LEVOCETIRIZINE  "DIHYDROCHLORIDE PO       metoprolol tartrate (LOPRESSOR) 50 MG tablet Take 25 mg by mouth (Patient not taking: Reported on 11/25/2024)      metroNIDAZOLE (FLAGYL) 500 MG tablet       Sulfacetamide Sodium-Sulfur 10-5 % LIQD       traZODone (DESYREL) 50 MG tablet Take 50 mg by mouth         Allergies   Allergen Reactions    Bee Venom Protein (Honey Bee) [Bee Venom] Unknown    Sulfa (Sulfonamide Antibiotics) [Sulfa Antibiotics] Rash        Social History     Tobacco Use    Smoking status: Former     Passive exposure: Past    Smokeless tobacco: Never   Substance Use Topics    Alcohol use: Yes       History   Drug Use No           Constitutional: No fever or unexplained weight loss.  No severe fatigue.    HEENT: Negative for ear pain, changes in hearing, frequent nosebleeds, nasal congestion, runny nose, sore throat, loose teeth, dentures or partials.    Eyes: Denies any changes in vision or eye pain.    Respiratory: Negative for cough, wheezing or shortness of breath.    Cardiovascular: No tachycardia, chest pain or lower extremity edema. History of palpitations on and off.     Gastrointestinal: Negative for nausea, vomiting, abdominal pain, uncontrolled heartburn or changes in bowel movements. History of IBS.     Genitourinary: Negative for any urinary symptoms or changes in urinary habits.    Integumentary: Negative for any rash or suspicious lesions.    Musculoskeletal: Negative except as noted in HPI.    Neurological: Negative for severe dizziness, headaches.    Psychiatric: Has anxiety and issues with sleep. Is on medications for these.   Denies recreational drug use or regular use of alcohol.    Allergic/immunologic: Negative for any history of complications with anesthesia.  History of seasonal allergies managed with Xyzal.  No known exposure to HIV or hepatitis C.      Objective    /86 (BP Location: Right arm, Patient Position: Sitting)   Pulse 68   Temp 97.6  F (36.4  C)   Resp 16   Ht 1.626 m (5' 4\") " "  Wt 66.7 kg (147 lb)   LMP  (LMP Unknown)   SpO2 99%   BMI 25.23 kg/m     Estimated body mass index is 25.23 kg/m  as calculated from the following:    Height as of this encounter: 1.626 m (5' 4\").    Weight as of this encounter: 66.7 kg (147 lb).  Physical Exam    Constitutional: In no acute distress.  Clean appearance.  Eyes: PERRLA.  EOMI.  No conjunctival redness.  Ears: Bilateral TMs are intact without any erythema or effusion.  Grossly normal hearing.  Nose: Nares patent bilaterally.  Normal mucosa.  Oropharynx: Normal mucosa.  Dentition and gingiva is appropriate.  Posterior oropharynx without any abnormalities.  Neck: Supple.  Trachea is midline.  No thyromegaly.  Neck is without masses.  Cardiovascular: Regular rate and rhythm.  Normal peripheral perfusion.  No edema.  No varicosities.  Respiratory: Lungs are clear bilaterally.  Normal respiratory effort.  Skin: Skin is without significant rashes or lesions.  No suspicious moles.  Gastrointestinal: Soft and flat.  Normal bowel sounds.  Nontender throughout upon palpation.  No organomegaly or masses.  Negative for CVA tenderness.  Musculoskeletal: Extremities without any cyanosis or clubbing.  No joint swelling or deformities.  Normal range of motion of extremities.  Gait normal.  Able to mount exam table without difficulties.  Psychiatric: Appropriate affect and demeanor.  Memory intact.  Good insight and judgment.  Neurologic: Sensation and temperature of extremities appropriate.  No tremor or involuntary movement noted.   Recent Labs   Lab Test 01/30/24  1235   HGB 13.8   *   POTASSIUM 4.3   CR 0.93   A1C 5.7*        Diagnostics  Labs pending at this time.  Results will be reviewed when available.   EKG: appears normal, NSR, normal axis, normal intervals, no acute ST/T changes c/w ischemia, no LVH by voltage criteria, there are no prior tracings available    Revised Cardiac Risk Index (RCRI)  The patient has the following serious cardiovascular " risks for perioperative complications:   - No serious cardiac risks = 0 points     RCRI Interpretation: 0 points: Class I (very low risk - 0.4% complication rate)         Signed Electronically by: Arabella Miles NP  A copy of this evaluation report is provided to the requesting physician.

## 2025-04-09 ENCOUNTER — LAB (OUTPATIENT)
Dept: LAB | Facility: CLINIC | Age: 60
End: 2025-04-09
Payer: COMMERCIAL

## 2025-04-09 DIAGNOSIS — M54.2 NECK PAIN: Primary | ICD-10-CM

## 2025-04-09 PROCEDURE — 36415 COLL VENOUS BLD VENIPUNCTURE: CPT

## 2025-04-09 PROCEDURE — 80053 COMPREHEN METABOLIC PANEL: CPT

## 2025-04-09 PROCEDURE — 83735 ASSAY OF MAGNESIUM: CPT

## 2025-04-10 LAB
ALBUMIN SERPL BCG-MCNC: 4.8 G/DL (ref 3.5–5.2)
ALP SERPL-CCNC: 67 U/L (ref 40–150)
ALT SERPL W P-5'-P-CCNC: 14 U/L (ref 0–50)
ANION GAP SERPL CALCULATED.3IONS-SCNC: 11 MMOL/L (ref 7–15)
AST SERPL W P-5'-P-CCNC: 25 U/L (ref 0–45)
BILIRUB SERPL-MCNC: 0.4 MG/DL
BUN SERPL-MCNC: 12.1 MG/DL (ref 6–20)
CALCIUM SERPL-MCNC: 9.7 MG/DL (ref 8.8–10.4)
CHLORIDE SERPL-SCNC: 94 MMOL/L (ref 98–107)
CREAT SERPL-MCNC: 0.69 MG/DL (ref 0.51–0.95)
EGFRCR SERPLBLD CKD-EPI 2021: >90 ML/MIN/1.73M2
GLUCOSE SERPL-MCNC: 121 MG/DL (ref 70–99)
HCO3 SERPL-SCNC: 27 MMOL/L (ref 22–29)
MAGNESIUM SERPL-MCNC: 1.8 MG/DL (ref 1.7–2.3)
POTASSIUM SERPL-SCNC: 4.5 MMOL/L (ref 3.4–5.3)
PROT SERPL-MCNC: 7.1 G/DL (ref 6.4–8.3)
SODIUM SERPL-SCNC: 132 MMOL/L (ref 135–145)

## 2025-06-19 ENCOUNTER — MEDICAL CORRESPONDENCE (OUTPATIENT)
Dept: HEALTH INFORMATION MANAGEMENT | Facility: CLINIC | Age: 60
End: 2025-06-19
Payer: COMMERCIAL

## 2025-06-19 ENCOUNTER — TRANSCRIBE ORDERS (OUTPATIENT)
Dept: OTHER | Age: 60
End: 2025-06-19

## 2025-06-19 DIAGNOSIS — G47.9 SLEEP DIFFICULTIES: ICD-10-CM

## 2025-06-19 DIAGNOSIS — R41.89 COGNITIVE CHANGES: Primary | ICD-10-CM

## 2025-06-19 DIAGNOSIS — R52 PAIN: ICD-10-CM

## 2025-06-23 ENCOUNTER — PATIENT OUTREACH (OUTPATIENT)
Dept: CARE COORDINATION | Facility: CLINIC | Age: 60
End: 2025-06-23
Payer: COMMERCIAL

## 2025-06-25 ENCOUNTER — PATIENT OUTREACH (OUTPATIENT)
Dept: CARE COORDINATION | Facility: CLINIC | Age: 60
End: 2025-06-25
Payer: COMMERCIAL

## 2025-08-13 ENCOUNTER — LAB (OUTPATIENT)
Dept: LAB | Facility: CLINIC | Age: 60
End: 2025-08-13
Payer: COMMERCIAL

## 2025-08-13 DIAGNOSIS — E87.1 HYPONATREMIA: ICD-10-CM

## 2025-08-13 LAB — SODIUM SERPL-SCNC: 130 MMOL/L (ref 135–145)

## 2025-08-13 PROCEDURE — 84295 ASSAY OF SERUM SODIUM: CPT

## 2025-08-13 PROCEDURE — 36415 COLL VENOUS BLD VENIPUNCTURE: CPT
